# Patient Record
Sex: FEMALE | Race: WHITE | NOT HISPANIC OR LATINO | Employment: OTHER | URBAN - METROPOLITAN AREA
[De-identification: names, ages, dates, MRNs, and addresses within clinical notes are randomized per-mention and may not be internally consistent; named-entity substitution may affect disease eponyms.]

---

## 2017-02-07 NOTE — PATIENT DISCUSSION
POAG, OU: GOOD IOP RESPONSE TO SLT OS. IMPROVED COMPLIANCE WITH LATANOPROST OU QHS - CONTINUE.  RECOMMEND SLT OD, GOAL IOP &lt;17

## 2017-02-07 NOTE — PATIENT DISCUSSION
POAG, OU: I have explained to the patient at length regarding the diagnosis of primary open angle glaucoma and its pathophysiology. I have discussed the various treatment options including medications and surgery. I recommend that the patient begin medical treatment. I emphasized to the patient the importance of compliance with treatment and follow-up appointments.

## 2017-03-27 NOTE — PATIENT DISCUSSION
POAG, OU: I have explained to the patient at length regarding the diagnosis of primary open angle glaucoma and its pathophysiology. I have discussed the various treatment options including medications and surgery. I recommend that the patient begin medical treatment.  I emphasized to the patient the importance of compliance with treatment and follow-up appointments

## 2017-03-27 NOTE — PATIENT DISCUSSION
POAG, OU: GOOD IOP RESPONSE TO SLT OU. IMPROVED COMPLIANCE WITH LATANOPROST OU QHS - CONTINUE. GOAL IOP &lt;17

## 2017-07-17 NOTE — PATIENT DISCUSSION
MODERATE DRY EYES: PRESCRIBED DISAPPEARING PRESERVATIVE OR PRESERVATIVE FREE ARTIFICIAL TEARS BID &ndash; QID4-6X A DAY, OU AND THE DAILY INTAKE OF OMEGA-3 DHA/EPA FATTY ACIDS TO HELP RELIEVE SYMPTOMS. PRESCRIBED LOTEMAX QID X 2 WEEKS THEN BID X 2 WEEKS. ADD NIGHTLY LUBRICATING OINTMENT OR GEL. WILL CONSIDER RESTASIS OR PUNCTAL PLUGS AND/OR LIPIFLOW TREATMENT NEXT VISIT IF NOT RESPONSIVE OR IF SYMPTOMS PERSIST. RETURN FOR FOLLOW-UP AS SCHEDULED OR SOONER IF SYMPTOMS WORSEN.

## 2017-07-17 NOTE — PATIENT DISCUSSION
POAG, OU: GOOD IOP RESPONSE TO SLT OU. IMPROVED COMPLIANCE WITH LATANOPROST OU QHS - CONTINUE. GOAL IOP &lt;17. OCT RNFL DONE TODAY. STABLE.

## 2018-01-02 NOTE — PATIENT DISCUSSION
POAG, OU: S/P SLT OU. IMPROVED COMPLIANCE WITH LATANOPROST OU QHS - CONTINUE. GOAL IOP &lt;17.  DISCUSSED VF 24-2 WITH PT.

## 2018-05-01 NOTE — PATIENT DISCUSSION
POAG, OU: S/P SLT OU. IMPROVED COMPLIANCE WITH LATANOPROST OU QHS - CONTINUE. GOAL IOP &lt;17. IOP  BORDERLINE TODAY - PT SQUEEZING, DIFFICULT IOP.  FU WITH VF / OCT RNFL - IF ANY CHANGES OR IOP REMAINS ELEVATED WILL RECOMMEND ADDITIONAL TX

## 2018-10-02 NOTE — PATIENT DISCUSSION
DRY EYES - PRESCRIBED ARTIFICIAL TEARS BID - QID, OU RETURN FOR FOLLOW-UP AS SCHEDULED OR SOONER IF SYMPTOMS WORSEN.

## 2018-10-02 NOTE — PATIENT DISCUSSION
POAG, OU: S/P SLT OU. STOP LATANOPROST. ADD LUMIGAN OU QHS. GOAL IOP &lt;17. IOP ELEVATED OD.  ADD SLT OD, LOW THRESHOLD FOR TREATMENT OS

## 2018-10-02 NOTE — PATIENT DISCUSSION
New Prescription: Lumigan (bimatoprost): drops: 0.01% 1 drop at bedtime as directed into both eyes 10-

## 2019-02-05 NOTE — PATIENT DISCUSSION
POAG, OU: S/P SLT OU ELEVATED IOP. CONTINUE LUMIGAN OU QHS. GOAL IOP &lt;17. DISCUSSED ADDING GTTS VS ADDING CYCLO OR KDB. PT WISHES TO ADD CYCLO OD THEN OS.

## 2019-03-15 NOTE — PATIENT DISCUSSION
POAG, OU: S/P SLT OU, CPC OD. CONTINUE LUMIGAN OU QHS. GOAL IOP &lt;17. PT WISHES TO PROCEED WITH  CYCLO  OS.

## 2019-03-15 NOTE — PATIENT DISCUSSION
Continue: Durezol (difluprednate): drops: 0.05% 1 drop three times a day as directed into affected eye 02-

## 2019-04-05 NOTE — PATIENT DISCUSSION
MILD REBOUND IRITIS, OD: DUREZOL BID X 1 WEEK THEN QDAY X 1 WEEK. SAMPLES GIVEN OK TO WAIT ON CPC OS IF PT WISHES. KEEP SCHEDULED FOR NOW.

## 2019-08-16 NOTE — PATIENT DISCUSSION
New Prescription: TobraDex (tobramycin-dexamethasone): ointment: 0.3-0.1% 1 a small amount at bedtime as directed into both eyes 08-

## 2019-08-16 NOTE — PATIENT DISCUSSION
MEIBOMIAN GLAND DYSFUNCTION, OU:  PRESCRIBE WARM COMPRESSES AND EYELID SCRUBS QD-BID, ARTIFICIAL TEARS BID-QID, THE DAILY INTAKE OF OMEGA-3 FATTY ACIDS  AND TOBRADEX JENNI QHS X 2 WEEKS . Van Castañeda RETURN FOR FOLLOW-UP AS SCHEDULED.

## 2019-11-26 NOTE — PATIENT DISCUSSION
POAG, OU: S/P SLT OU, CPC OD. CONTINUE LUMIGAN OU QHS. REVIEWED RNFL AND VF WITH PT. LIMITED RELIABILITY ON VF DUE TO PT FALLING ASLEEP DURING TEST. RNFL STABLE.  CONTINUE LUMIGAN OU QHS

## 2020-02-25 ENCOUNTER — LASIK CONSULTATION (OUTPATIENT)
Dept: URBAN - METROPOLITAN AREA CLINIC 39 | Facility: CLINIC | Age: 40
End: 2020-02-25

## 2020-02-25 DIAGNOSIS — H52.7: ICD-10-CM

## 2020-02-25 PROCEDURE — 92025 CPTRIZED CORNEAL TOPOGRAPHY: CPT

## 2020-02-25 PROCEDURE — 99499LK REFRACTIVE CONSULT/LASIK

## 2020-02-25 PROCEDURE — 76514 ECHO EXAM OF EYE THICKNESS: CPT

## 2020-02-25 RX ORDER — PREDNISOLONE ACETATE 10 MG/ML: 1 SUSPENSION/ DROPS OPHTHALMIC

## 2020-02-25 RX ORDER — OXYCODONE HYDROCHLORIDE AND ACETAMINOPHEN 7.5; 325 MG/1; MG/1: TABLET ORAL

## 2020-02-25 RX ORDER — AMOXICILLIN 500 MG/1: 1 CAPSULE ORAL

## 2020-02-25 RX ORDER — MOXIFLOXACIN HYDROCHLORIDE 5 MG/ML: 1 SOLUTION/ DROPS OPHTHALMIC

## 2020-02-25 ASSESSMENT — VISUAL ACUITY
OD_SC: 20/50-1
OD_CC: J1
OD_CC: 20/20-1
OS_SC: 20/200
OD_SC: J1
OS_CC: 20/25-2
OS_CC: J1
OS_SC: J1

## 2020-02-25 ASSESSMENT — PACHYMETRY
OS_CT_UM: 497
OD_CT_UM: 498

## 2020-02-25 ASSESSMENT — TONOMETRY
OD_IOP_MMHG: 13
OS_IOP_MMHG: 13

## 2020-06-29 NOTE — PATIENT DISCUSSION
New Prescription: erythromycin (erythromycin): ointment: 5 mg/gram (0.5 %) a small amount at bedtime as directed on eyelid 06-

## 2021-01-04 NOTE — PATIENT DISCUSSION
Continue: erythromycin (erythromycin): ointment: 5 mg/gram (0.5 %) a small amount at bedtime as directed on eyelid 06-

## 2021-07-09 NOTE — PATIENT DISCUSSION
New Prescription: TobraDex (tobramycin-dexamethasone): ointment: 0.3-0.1% 1 a small amount at bedtime on eyelid 07-

## 2021-07-09 NOTE — PATIENT DISCUSSION
MEIBOMIAN GLAND DYSFUNCTION, OU:  PRESCRIBE WARM COMPRESSES AND EYELID SCRUBS QD-BID, ARTIFICIAL TEARS BID-QID, THE DAILY INTAKE OF OMEGA-3 FATTY ACIDS  AND TOBRADEX JENNI QHS. Raquel Rosa WILL CONSIDER LIPIFLOW TREATMENT NEXT VISIT IF NOT RESPONSIVE TO TREATMENT OR IF SYMPTOMS PERSIST. RETURN FOR FOLLOW-UP AS SCHEDULED.